# Patient Record
Sex: MALE
[De-identification: names, ages, dates, MRNs, and addresses within clinical notes are randomized per-mention and may not be internally consistent; named-entity substitution may affect disease eponyms.]

---

## 2019-11-20 ENCOUNTER — RECORDS - HEALTHEAST (OUTPATIENT)
Dept: ADMINISTRATIVE | Facility: OTHER | Age: 24
End: 2019-11-20

## 2019-11-22 ENCOUNTER — RECORDS - HEALTHEAST (OUTPATIENT)
Dept: ADMINISTRATIVE | Facility: OTHER | Age: 24
End: 2019-11-22

## 2019-11-26 ENCOUNTER — ANESTHESIA - HEALTHEAST (OUTPATIENT)
Dept: SURGERY | Facility: AMBULATORY SURGERY CENTER | Age: 24
End: 2019-11-26

## 2019-12-03 ASSESSMENT — MIFFLIN-ST. JEOR
SCORE: 1957.87
SCORE: 1957.87

## 2019-12-09 ENCOUNTER — ANESTHESIA - HEALTHEAST (OUTPATIENT)
Dept: SURGERY | Facility: AMBULATORY SURGERY CENTER | Age: 24
End: 2019-12-09

## 2019-12-10 ENCOUNTER — SURGERY - HEALTHEAST (OUTPATIENT)
Dept: SURGERY | Facility: AMBULATORY SURGERY CENTER | Age: 24
End: 2019-12-10

## 2019-12-10 ENCOUNTER — HOSPITAL ENCOUNTER (OUTPATIENT)
Dept: SURGERY | Facility: AMBULATORY SURGERY CENTER | Age: 24
Discharge: HOME | End: 2019-12-10
Attending: COLON & RECTAL SURGERY | Admitting: COLON & RECTAL SURGERY

## 2019-12-10 ASSESSMENT — MIFFLIN-ST. JEOR
SCORE: 1957.87
SCORE: 1957.87

## 2021-06-03 VITALS — BODY MASS INDEX: 27.8 KG/M2 | WEIGHT: 205 LBS | BODY MASS INDEX: 27.8 KG/M2 | WEIGHT: 205 LBS

## 2021-06-03 VITALS
WEIGHT: 205 LBS | BODY MASS INDEX: 27.77 KG/M2 | WEIGHT: 205 LBS | HEIGHT: 72 IN | HEIGHT: 72 IN | BODY MASS INDEX: 27.77 KG/M2

## 2021-06-03 VITALS — HEIGHT: 72 IN | HEIGHT: 72 IN

## 2021-06-04 NOTE — ANESTHESIA PREPROCEDURE EVALUATION
Anesthesia Evaluation      Patient summary reviewed   No history of anesthetic complications     Airway   Mallampati: II   Pulmonary - negative ROS and normal exam                          Cardiovascular - negative ROS and normal exam  Rhythm: regular  Rate: normal,         Neuro/Psych - negative ROS     Endo/Other - negative ROS      GI/Hepatic/Renal - negative ROS           Dental - normal exam                        Anesthesia Plan  Planned anesthetic: MAC  propofol ggt  ASA 2     Anesthetic plan and risks discussed with: patient    Post-op plan: routine recovery

## 2021-06-04 NOTE — PRE-PROCEDURE
I have performed an assessment and examined the patient, as necessary, to update the patient's current status that may have changed since the prior History and Physical.  The History & Physical has been reviewed and no updates are needed.        AVSS  Heart and lungs assessed  CTAB  RRR    Libby Leija MD

## 2021-06-04 NOTE — ANESTHESIA POSTPROCEDURE EVALUATION
Patient: Bartolome Hollins  COLONOSCOPY, HEMORROID BANDING  Anesthesia type: MAC    Patient location: Phase II Recovery  Last vitals:   Vitals Value Taken Time   /56 12/10/2019 10:24 AM   Temp  12/10/2019  4:41 PM   Pulse 65 12/10/2019 10:15 AM   Resp 16 12/10/2019 10:15 AM   SpO2 95%  (per chart review) 12/10/2019 10:17 AM   Vitals shown include unvalidated device data.  Post vital signs: stable  Level of consciousness: awake and responds to simple questions  Post-anesthesia pain: pain controlled  Post-anesthesia nausea and vomiting: no  Pulmonary: unassisted, return to baseline  Cardiovascular: stable and blood pressure at baseline  Hydration: adequate  Anesthetic events: no    QCDR Measures:  ASA# 11 - Rossy-op Cardiac Arrest: ASA11B - Patient did NOT experience unanticipated cardiac arrest  ASA# 12 - Rossy-op Mortality Rate: ASA12B - Patient did NOT die  ASA# 13 - PACU Re-Intubation Rate: NA - No ETT / LMA used for case  ASA# 10 - Composite Anes Safety: ASA10A - No serious adverse event    Additional Notes:

## 2021-06-04 NOTE — ANESTHESIA CARE TRANSFER NOTE
Last vitals:   Vitals:    12/10/19 1000   BP: 120/55   Pulse: 63   Resp: 18   Temp: 36.2  C (97.2  F)   SpO2: 97%     Patient's level of consciousness is drowsy  Spontaneous respirations: yes  Maintains airway independently: yes  Dentition unchanged: yes  Oropharynx: oropharynx clear of all foreign objects    QCDR Measures:  ASA# 20 - Surgical Safety Checklist: WHO surgical safety checklist completed prior to induction    PQRS# 430 - Adult PONV Prevention: 4558F - Pt received => 2 anti-emetic agents (different classes) preop & intraop  ASA# 8 - Peds PONV Prevention: NA - Not pediatric patient, not GA or 2 or more risk factors NOT present  PQRS# 424 - Rossy-op Temp Management: 4559F - At least one body temp DOCUMENTED => 35.5C or 95.9F within required timeframe  PQRS# 426 - PACU Transfer Protocol: - Transfer of care checklist used  ASA# 14 - Acute Post-op Pain: ASA14B - Patient did NOT experience pain >= 7 out of 10

## 2021-06-04 NOTE — OP NOTE
CRSAL COLONOSCOPY PROCEDURE NOTE        Patient Name:            Bartolome Hollins      Date of Procedure:   December 10, 2019      Endoscopist:             Libby Leija        Procedure:                                COLONOSCOPY      Hemorrhoid banding      Pre-operative Diagnosis:         Rectal bleeding      Post-operative Diagnosis:      Internal hemorrhoids          Indications:                             rectal bleeding      Medications:                            Current Facility-Administered Medications   Medication Dose Route Frequency Provider Last Rate Last Dose     diphenhydrAMINE injection 12.5 mg (BENADRYL)  12.5 mg Intravenous Q5 Min PRN Gerardo Jung MD         fentaNYL pf injection 25-50 mcg (SUBLIMAZE)  25-50 mcg Intravenous Q5 Min PRN Gerardo Jung MD         haloperidol lactate injection 1 mg (HALDOL)  1 mg Intravenous Once PRN Gerardo Jung MD         HYDROcodone-acetaminophen 5-325 mg per tablet 2 tablet  2 tablet Oral Q4H PRN eGrardo Jung MD         lactated Ringers  100 mL/hr Intravenous Continuous PRN Alex Chirinos MD         meperidine (PF) injection 12.5 mg (DEMEROL)  12.5 mg Intravenous Q5 Min PRN Gerardo Jung MD         nalbuphine injection 5 mg (NUBAIN)  5 mg Intravenous PRN Gerardo Jung MD         naloxone injection 0.2-0.4 mg (NARCAN)  0.2-0.4 mg Intravenous PRN Gerardo Jung MD        Or     naloxone injection 0.2-0.4 mg (NARCAN)  0.2-0.4 mg Intramuscular PRGerardo Lane MD         ondansetron injection 4 mg (ZOFRAN)  4 mg Intravenous Once PRN Gerardo Jung MD         oxyCODONE-acetaminophen 5-325 mg 2 tablet (PERCOCET/ENDOCET)  2 tablet Oral Q4H PRN Gerardo Jung MD                 Procedure Details    The patient was informed of the risks, benefits and alternatives and gave informed consent. The patient had stable cardiovascular status and was judged to be an  adequate candidate for moderate conscious sedation. A timeout  was performed.    The patient was placed in the left lateral decubitus position. A digital rectal examination was performed. The endoscope was introduced through the anus and advanced to the cecum. The cecum was identified by the presence of the ileocecal valve and appendiceal orifice.Careful inspection was made upon withdrawal. Retroflexion was performed.  The quality of the preparation was excellent.    I then inspected his hemorrhoids. He had mildly enlarged grade II internal hemorrhoids, the largest in the right posterior. I placed a single band on this.      Findings:    Normal colon  Internal hemorrhoids        Estimated Blood Loss:           none    Specimens:                              none    Withdrawal time: 17 minutes     Complications:                       None; patient tolerated the procedure well.      Impression:                             Internal hemorrhoids          Recommendations:                High fiber diet with metamucil  Discharge home  Repeat colonoscopy at age 52      Libby Leija  12/10/2019 10:09 AM  [facility]